# Patient Record
Sex: FEMALE | Race: WHITE | NOT HISPANIC OR LATINO | Employment: FULL TIME | URBAN - METROPOLITAN AREA
[De-identification: names, ages, dates, MRNs, and addresses within clinical notes are randomized per-mention and may not be internally consistent; named-entity substitution may affect disease eponyms.]

---

## 2021-04-16 DIAGNOSIS — Z23 ENCOUNTER FOR IMMUNIZATION: ICD-10-CM

## 2021-05-03 ENCOUNTER — IMMUNIZATIONS (OUTPATIENT)
Dept: FAMILY MEDICINE CLINIC | Facility: HOSPITAL | Age: 26
End: 2021-05-03

## 2021-05-03 DIAGNOSIS — Z23 ENCOUNTER FOR IMMUNIZATION: Primary | ICD-10-CM

## 2021-05-03 PROCEDURE — 0011A SARS-COV-2 / COVID-19 MRNA VACCINE (MODERNA) 100 MCG: CPT

## 2021-05-03 PROCEDURE — 91301 SARS-COV-2 / COVID-19 MRNA VACCINE (MODERNA) 100 MCG: CPT

## 2021-06-02 ENCOUNTER — IMMUNIZATIONS (OUTPATIENT)
Dept: FAMILY MEDICINE CLINIC | Facility: HOSPITAL | Age: 26
End: 2021-06-02

## 2021-06-02 DIAGNOSIS — Z23 ENCOUNTER FOR IMMUNIZATION: Primary | ICD-10-CM

## 2021-06-02 PROCEDURE — 0012A SARS-COV-2 / COVID-19 MRNA VACCINE (MODERNA) 100 MCG: CPT

## 2021-06-02 PROCEDURE — 91301 SARS-COV-2 / COVID-19 MRNA VACCINE (MODERNA) 100 MCG: CPT

## 2023-09-03 ENCOUNTER — APPOINTMENT (EMERGENCY)
Dept: RADIOLOGY | Facility: HOSPITAL | Age: 28
End: 2023-09-03

## 2023-09-03 ENCOUNTER — HOSPITAL ENCOUNTER (EMERGENCY)
Facility: HOSPITAL | Age: 28
Discharge: HOME | End: 2023-09-03
Attending: EMERGENCY MEDICINE

## 2023-09-03 VITALS
HEIGHT: 68 IN | SYSTOLIC BLOOD PRESSURE: 123 MMHG | OXYGEN SATURATION: 100 % | TEMPERATURE: 98.3 F | BODY MASS INDEX: 19.7 KG/M2 | HEART RATE: 72 BPM | RESPIRATION RATE: 18 BRPM | DIASTOLIC BLOOD PRESSURE: 85 MMHG | WEIGHT: 130 LBS

## 2023-09-03 DIAGNOSIS — J02.9 PHARYNGITIS, UNSPECIFIED ETIOLOGY: Primary | ICD-10-CM

## 2023-09-03 DIAGNOSIS — R07.81 PLEURITIC PAIN: ICD-10-CM

## 2023-09-03 LAB
ALBUMIN SERPL BCP-MCNC: 4.3 G/DL (ref 3.5–5)
ALP SERPL-CCNC: 53 U/L (ref 34–104)
ALT SERPL W P-5'-P-CCNC: 13 U/L (ref 7–52)
ANION GAP SERPL CALCULATED.3IONS-SCNC: 7 MMOL/L
AST SERPL W P-5'-P-CCNC: 15 U/L (ref 13–39)
BASOPHILS # BLD AUTO: 0.02 THOUSANDS/ÂΜL (ref 0–0.1)
BASOPHILS NFR BLD AUTO: 0 % (ref 0–1)
BILIRUB SERPL-MCNC: 0.31 MG/DL (ref 0.2–1)
BUN SERPL-MCNC: 14 MG/DL (ref 5–25)
CALCIUM SERPL-MCNC: 9.3 MG/DL (ref 8.4–10.2)
CHLORIDE SERPL-SCNC: 103 MMOL/L (ref 96–108)
CO2 SERPL-SCNC: 27 MMOL/L (ref 21–32)
CREAT SERPL-MCNC: 0.66 MG/DL (ref 0.6–1.3)
EOSINOPHIL # BLD AUTO: 0.05 THOUSAND/ÂΜL (ref 0–0.61)
EOSINOPHIL NFR BLD AUTO: 1 % (ref 0–6)
ERYTHROCYTE [DISTWIDTH] IN BLOOD BY AUTOMATED COUNT: 11.9 % (ref 11.6–15.1)
GFR SERPL CREATININE-BSD FRML MDRD: 122 ML/MIN/1.73SQ M
GLUCOSE SERPL-MCNC: 80 MG/DL (ref 65–140)
HCT VFR BLD AUTO: 38.4 % (ref 34.8–46.1)
HGB BLD-MCNC: 12.5 G/DL (ref 11.5–15.4)
IMM GRANULOCYTES # BLD AUTO: 0 THOUSAND/UL (ref 0–0.2)
IMM GRANULOCYTES NFR BLD AUTO: 0 % (ref 0–2)
LYMPHOCYTES # BLD AUTO: 1.88 THOUSANDS/ÂΜL (ref 0.6–4.47)
LYMPHOCYTES NFR BLD AUTO: 40 % (ref 14–44)
MCH RBC QN AUTO: 31.3 PG (ref 26.8–34.3)
MCHC RBC AUTO-ENTMCNC: 32.6 G/DL (ref 31.4–37.4)
MCV RBC AUTO: 96 FL (ref 82–98)
MONOCYTES # BLD AUTO: 0.51 THOUSAND/ÂΜL (ref 0.17–1.22)
MONOCYTES NFR BLD AUTO: 11 % (ref 4–12)
NEUTROPHILS # BLD AUTO: 2.2 THOUSANDS/ÂΜL (ref 1.85–7.62)
NEUTS SEG NFR BLD AUTO: 48 % (ref 43–75)
NRBC BLD AUTO-RTO: 0 /100 WBCS
PLATELET # BLD AUTO: 253 THOUSANDS/UL (ref 149–390)
PMV BLD AUTO: 11.6 FL (ref 8.9–12.7)
POTASSIUM SERPL-SCNC: 3.6 MMOL/L (ref 3.5–5.3)
PROT SERPL-MCNC: 7.4 G/DL (ref 6.4–8.4)
RBC # BLD AUTO: 3.99 MILLION/UL (ref 3.81–5.12)
S PYO DNA THROAT QL NAA+PROBE: NOT DETECTED
SODIUM SERPL-SCNC: 137 MMOL/L (ref 135–147)
TSH SERPL DL<=0.05 MIU/L-ACNC: 1.19 UIU/ML (ref 0.45–4.5)
WBC # BLD AUTO: 4.66 THOUSAND/UL (ref 4.31–10.16)

## 2023-09-03 PROCEDURE — 86308 HETEROPHILE ANTIBODY SCREEN: CPT

## 2023-09-03 PROCEDURE — 87651 STREP A DNA AMP PROBE: CPT | Performed by: EMERGENCY MEDICINE

## 2023-09-03 PROCEDURE — 71045 X-RAY EXAM CHEST 1 VIEW: CPT

## 2023-09-03 PROCEDURE — 93005 ELECTROCARDIOGRAM TRACING: CPT

## 2023-09-03 PROCEDURE — 36415 COLL VENOUS BLD VENIPUNCTURE: CPT

## 2023-09-03 PROCEDURE — 99283 EMERGENCY DEPT VISIT LOW MDM: CPT

## 2023-09-03 PROCEDURE — 84443 ASSAY THYROID STIM HORMONE: CPT

## 2023-09-03 PROCEDURE — 85025 COMPLETE CBC W/AUTO DIFF WBC: CPT

## 2023-09-03 PROCEDURE — 80053 COMPREHEN METABOLIC PANEL: CPT

## 2023-09-03 NOTE — ED ATTENDING ATTESTATION
9/3/2023  IJorge MD, saw and evaluated the patient. I have discussed the patient with the resident/non-physician practitioner and agree with the resident's/non-physician practitioner's findings, Plan of Care, and MDM as documented in the resident's/non-physician practitioner's note, except where noted. All available labs and Radiology studies were reviewed. I was present for key portions of any procedure(s) performed by the resident/non-physician practitioner and I was immediately available to provide assistance. At this point I agree with the current assessment done in the Emergency Department. I have conducted an independent evaluation of this patient a history and physical is as follows:    27-year-old otherwise healthy female presented for evaluation of about a week of right-sided pleuritic chest pain as well as a sore throat. Denies any difficulty breathing. No cough. No fever, chills. No sick contacts. Reports throat is been sore but no difficulty swallowing or breathing. No abdominal pain, nausea, vomiting. On exam here she is well-appearing overall. Vitals are normal.  Oropharynx moist with bilateral tonsillar edema with exudates. Midline uvula. No change in voice. Heart sounds normal.  Breath sounds are clear. Abdomen soft and nontender. No rib tenderness. PERC negative. Plan EKG, chest x-ray, labs. Differential diagnosis includes viral illness including mono, strep pharyngitis, pneumonia, pneumothorax. ED Course  ED Course as of 09/03/23 Porsha Pacheco Sep 03, 2023   1634 STREP A PCR: Not Detected   0968 CXR reviewed: Normal.   1821 Lab work unremarkable. Monospot pending. Stable for discharge. Recommended NSAIDs for discomfort. Avoid strenuous activity.          Critical Care Time  ECG 12 Lead Documentation Only    Date/Time: 9/3/2023 5:43 PM    Performed by: Jorge Rendon MD  Authorized by: Jorge Rendon MD    Indications / Diagnosis:  Chest pain  ECG reviewed by me, the ED Provider: yes    Patient location:  ED  Previous ECG:     Previous ECG:  Unavailable  Rate:     ECG rate:  61  Rhythm:     Rhythm: sinus rhythm    Ectopy:     Ectopy: none    QRS:     QRS axis:  Normal  Conduction:     Conduction: normal    ST segments:     ST segments:  Normal  T waves:     T waves: normal

## 2023-09-03 NOTE — ED PROVIDER NOTES
History  Chief Complaint   Patient presents with   • Rib Pain     Pt presenting with right sided rib pain, pain with inspiration and sore throat for a few days. She was cleaning out her grandmothers house and isn't sure if she inhaled something. Pt denies fevers, CP. Pt has been taking otc meds without relief. • Sore Throat     32 yo female with no pertinent PMH presents to the ED for evaluation of a sharp right-sided pain below her breast both anteriorly and posteriorly that began approximately 1 week ago. She notes the pain especially when she takes deep breaths. Additionally, patient reports a sore throat which with white/red patches to the posterior throat, fatigue, and generalized body aches. Her appetite has not changed, and she does not have any dysphagia. She states that she was cleaning out old rooms in her grandmother's house prior to the onset of these symptoms. Patient also states that she has been experiencing some stress recently, and has been experiencing palpitations, difficulty sleeping, and night sweats. Patient denies any fever, chills, shortness of breath, nausea, vomiting, abdominal pain, dysuria, or flank pain. None       History reviewed. No pertinent past medical history. History reviewed. No pertinent surgical history. History reviewed. No pertinent family history. I have reviewed and agree with the history as documented. E-Cigarette/Vaping     E-Cigarette/Vaping Substances     Social History     Tobacco Use   • Smoking status: Never   Substance Use Topics   • Alcohol use: No   • Drug use: No        Review of Systems   Constitutional: Positive for fatigue. Negative for chills and fever. Generalized body aches   HENT: Positive for sore throat. Negative for ear pain. Eyes: Negative for pain and visual disturbance. Respiratory: Negative for cough and shortness of breath.          Sharp chest wall pain below right breast anteriorly and posteriorly Cardiovascular: Negative for chest pain and palpitations. Gastrointestinal: Negative for abdominal pain and vomiting. Genitourinary: Negative for dysuria and hematuria. Musculoskeletal: Negative for arthralgias and back pain. Skin: Negative for color change and rash. Neurological: Negative for seizures and syncope. All other systems reviewed and are negative. Physical Exam  ED Triage Vitals [09/03/23 1532]   Temperature Pulse Respirations Blood Pressure SpO2   98.3 °F (36.8 °C) 73 17 147/96 100 %      Temp Source Heart Rate Source Patient Position - Orthostatic VS BP Location FiO2 (%)   Oral Monitor Sitting Right arm --      Pain Score       --             Orthostatic Vital Signs  Vitals:    09/03/23 1532 09/03/23 1733   BP: 147/96 123/85   Pulse: 73 72   Patient Position - Orthostatic VS: Sitting Lying       Physical Exam  Vitals and nursing note reviewed. Constitutional:       General: She is not in acute distress. Appearance: She is well-developed. She is not ill-appearing. HENT:      Head: Normocephalic and atraumatic. Nose: No congestion. Mouth/Throat:      Mouth: Mucous membranes are dry. Tonsils: Tonsillar exudate (bilaterally) present. Eyes:      Conjunctiva/sclera: Conjunctivae normal.   Cardiovascular:      Rate and Rhythm: Normal rate and regular rhythm. Heart sounds: No murmur heard. Pulmonary:      Effort: Pulmonary effort is normal. No respiratory distress. Breath sounds: Normal breath sounds. Abdominal:      Palpations: Abdomen is soft. Tenderness: There is no abdominal tenderness. Musculoskeletal:         General: No swelling. Cervical back: Neck supple. Lymphadenopathy:      Cervical: No cervical adenopathy. Skin:     General: Skin is warm and dry. Capillary Refill: Capillary refill takes less than 2 seconds. Neurological:      Mental Status: She is alert.    Psychiatric:         Mood and Affect: Mood normal.         ED Medications  Medications - No data to display    Diagnostic Studies  Results Reviewed     Procedure Component Value Units Date/Time    TSH, 3rd generation with Free T4 reflex [[id number]]  (Normal) Collected: 09/03/23 1726    Lab Status: Final result Specimen: Blood from Arm, Right Updated: 09/03/23 1820     TSH 3RD GENERATON 1.193 uIU/mL     Comprehensive metabolic panel [[id number]] Collected: 09/03/23 1726    Lab Status: Final result Specimen: Blood from Arm, Right Updated: 09/03/23 1807     Sodium 137 mmol/L      Potassium 3.6 mmol/L      Chloride 103 mmol/L      CO2 27 mmol/L      ANION GAP 7 mmol/L      BUN 14 mg/dL      Creatinine 0.66 mg/dL      Glucose 80 mg/dL      Calcium 9.3 mg/dL      AST 15 U/L      ALT 13 U/L      Alkaline Phosphatase 53 U/L      Total Protein 7.4 g/dL      Albumin 4.3 g/dL      Total Bilirubin 0.31 mg/dL      eGFR 122 ml/min/1.73sq m     Narrative:      [facility] guidelines for Chronic Kidney Disease (CKD):   •  Stage 1 with normal or high GFR (GFR > 90 mL/min/1.73 square meters)  •  Stage 2 Mild CKD (GFR = 60-89 mL/min/1.73 square meters)  •  Stage 3A Moderate CKD (GFR = 45-59 mL/min/1.73 square meters)  •  Stage 3B Moderate CKD (GFR = 30-44 mL/min/1.73 square meters)  •  Stage 4 Severe CKD (GFR = 15-29 mL/min/1.73 square meters)  •  Stage 5 End Stage CKD (GFR <15 mL/min/1.73 square meters)  Note: GFR calculation is accurate only with a steady state creatinine    CBC and differential [[id number]] Collected: 09/03/23 1726    Lab Status: Final result Specimen: Blood from Arm, Right Updated: 09/03/23 1747     WBC 4.66 Thousand/uL      RBC 3.99 Million/uL      Hemoglobin 12.5 g/dL      Hematocrit 38.4 %      MCV 96 fL      MCH 31.3 pg      MCHC 32.6 g/dL      RDW 11.9 %      MPV 11.6 fL      Platelets 445 Thousands/uL      nRBC 0 /100 WBCs      Neutrophils Relative 48 %      Immat GRANS % 0 %      Lymphocytes Relative 40 %      Monocytes Relative 11 %      Eosinophils Relative 1 %      Basophils Relative 0 %      Neutrophils Absolute 2.20 Thousands/µL      Immature Grans Absolute 0.00 Thousand/uL      Lymphocytes Absolute 1.88 Thousands/µL      Monocytes Absolute 0.51 Thousand/µL      Eosinophils Absolute 0.05 Thousand/µL      Basophils Absolute 0.02 Thousands/µL     Mononucleosis screen [[id number]] Collected: 09/03/23 1726    Lab Status: In process Specimen: Blood from Arm, Right Updated: 09/03/23 1743    Strep A PCR [[id number]]  (Normal) Collected: 09/03/23 1539    Lab Status: Final result Specimen: Throat Updated: 09/03/23 1620     STREP A PCR Not Detected                 XR chest 1 view portable    (Results Pending)         Procedures  ECG 12 Lead Documentation Only    Date/Time: 9/3/2023 5:57 PM    Performed by: Esha Godwin MD  Authorized by: Esha Godwin MD    ECG reviewed by me, the ED Provider: yes    Previous ECG:     Previous ECG:  Unavailable  Interpretation:     Interpretation: normal    Rate:     ECG rate:  61    ECG rate assessment: normal    Rhythm:     Rhythm: sinus rhythm    Ectopy:     Ectopy: none    QRS:     QRS axis:  Normal  Conduction:     Conduction: normal    ST segments:     ST segments:  Normal  T waves:     T waves: normal            ED Course  ED Course as of 09/03/23 1905   Sun Sep 03, 2023   1706 STREP A PCR: Not Detected   1711 XR chest 1 view portable  No acute pathology noted. No effusion or consolidation.    1745 ECG - NSR, unremarkable                     PERC Rule for PE    Flowsheet Row Most Recent Value   PERC Rule for PE    Age >=50 0 Filed at: 09/03/2023 1757   HR >=100 0 Filed at: 09/03/2023 1757   O2 Sat on room air < 95% 0 Filed at: 09/03/2023 1757   History of PE or DVT 0 Filed at: 09/03/2023 1757   Recent trauma or surgery 0 Filed at: 09/03/2023 1757   Hemoptysis 0 Filed at: 09/03/2023 1757   Exogenous estrogen 0 Filed at: 09/03/2023 1757   Unilateral leg swelling 0 Filed at: 09/03/2023 1757   PERC Rule for PE Results 0 Filed at: 09/03/2023 1757                        Medical Decision Making  32 yo female with no pertinent PMH presents to the ED for evaluation of a sharp right-sided pain below her breast both anteriorly and posteriorly that began approximately 1 week ago. She notes the pain especially when she takes deep breaths. Additionally, patient reports a sore throat which with white/red patches to the posterior throat, fatigue, and generalized body aches. Patient also states that she has been experiencing some stress recently, and has been experiencing palpitations, difficulty sleeping, and night sweats. On exam, tonsillar exudates were noted bilaterally. Differential diagnoses include strep throat, viral illness, pleuritic chest pain. Labs ordered to rule out electrolyte abnormalities, anemia, thyroid dysfunction, strep pharyngitis, infectious mononucleosis. As labs and EKG unremarkable, patient is cleared for discharge to home. Mononucleosis screen pending, and patient made aware. Recommend rest, increased fluid intake, and avoidance of strenuous activities. Amount and/or Complexity of Data Reviewed  Labs: ordered. Decision-making details documented in ED Course. ECG/medicine tests: ordered. Decision-making details documented in ED Course. Disposition  Final diagnoses:   Pharyngitis, unspecified etiology   Pleuritic pain     Time reflects when diagnosis was documented in both MDM as applicable and the Disposition within this note     Time User Action Codes Description Comment    9/3/2023  6:52 PM Sharlene Pereira Add [J02.9] Pharyngitis, unspecified etiology     9/3/2023  6:52 PM Sharlene Pereira Add [R07.81] Pleuritic pain       ED Disposition     ED Disposition   Discharge    Condition   Stable    Date/Time   Sun Sep 3, 2023  6:52 PM    Comment   Timmy Gave discharge to home/self care.                Follow-up Information    None         Patient's Medications    No medications on file     No discharge procedures on file.    PDMP Review     None           ED Provider  Attending physically available and evaluated [address]. I managed the patient along with the ED Attending.     Electronically Signed by         Aleksey Kiran MD  09/03/23 0619

## 2023-09-03 NOTE — DISCHARGE INSTRUCTIONS
In the event of any new or worsening symptoms, contact your PCP and/or return to the ED immediately.

## 2023-09-04 LAB — HETEROPH AB SER QL: NEGATIVE

## 2023-09-06 LAB
ATRIAL RATE: 61 BPM
P AXIS: 75 DEGREES
PR INTERVAL: 146 MS
QRS AXIS: 72 DEGREES
QRSD INTERVAL: 76 MS
QT INTERVAL: 420 MS
QTC INTERVAL: 422 MS
T WAVE AXIS: 75 DEGREES
VENTRICULAR RATE: 61 BPM

## 2023-09-06 PROCEDURE — 93010 ELECTROCARDIOGRAM REPORT: CPT | Performed by: INTERNAL MEDICINE

## 2024-02-27 ENCOUNTER — HOSPITAL ENCOUNTER (EMERGENCY)
Facility: HOSPITAL | Age: 29
Discharge: HOME | End: 2024-02-27
Attending: EMERGENCY MEDICINE | Admitting: EMERGENCY MEDICINE

## 2024-02-27 VITALS
DIASTOLIC BLOOD PRESSURE: 87 MMHG | TEMPERATURE: 98.4 F | HEART RATE: 67 BPM | SYSTOLIC BLOOD PRESSURE: 139 MMHG | RESPIRATION RATE: 18 BRPM | OXYGEN SATURATION: 100 %

## 2024-02-27 DIAGNOSIS — R21 RASH: Primary | ICD-10-CM

## 2024-02-27 PROCEDURE — 99282 EMERGENCY DEPT VISIT SF MDM: CPT

## 2024-02-27 PROCEDURE — 99284 EMERGENCY DEPT VISIT MOD MDM: CPT | Performed by: EMERGENCY MEDICINE

## 2024-02-27 RX ORDER — LORATADINE 10 MG/1
10 TABLET ORAL DAILY
Status: DISCONTINUED | OUTPATIENT
Start: 2024-02-27 | End: 2024-02-27 | Stop reason: HOSPADM

## 2024-02-27 RX ORDER — CETIRIZINE HYDROCHLORIDE 10 MG/1
10 TABLET ORAL DAILY
Qty: 10 TABLET | Refills: 0 | Status: SHIPPED | OUTPATIENT
Start: 2024-02-27 | End: 2024-03-08

## 2024-02-27 RX ORDER — PREDNISONE 20 MG/1
40 TABLET ORAL DAILY
Qty: 8 TABLET | Refills: 0 | Status: SHIPPED | OUTPATIENT
Start: 2024-02-27 | End: 2024-03-02

## 2024-02-27 RX ORDER — PREDNISONE 20 MG/1
60 TABLET ORAL ONCE
Status: COMPLETED | OUTPATIENT
Start: 2024-02-27 | End: 2024-02-27

## 2024-02-27 RX ADMIN — PREDNISONE 60 MG: 20 TABLET ORAL at 12:02

## 2024-02-27 RX ADMIN — LORATADINE 10 MG: 10 TABLET ORAL at 12:02

## 2024-02-27 NOTE — Clinical Note
Isabel Mehta was seen and treated in our emergency department on 2/27/2024.                Diagnosis:     Isabel  .    She may return on this date:          If you have any questions or concerns, please don't hesitate to call.      Dennis Clarke MD    ______________________________           _______________          _______________  Hospital Representative                              Date                                Time

## 2024-02-27 NOTE — ED ATTENDING ATTESTATION
2/27/2024  IEliana DO, saw and evaluated the patient. I have discussed the patient with the resident/non-physician practitioner and agree with the resident's/non-physician practitioner's findings, Plan of Care, and MDM as documented in the resident's/non-physician practitioner's note, except where noted. All available labs and Radiology studies were reviewed.  I was present for key portions of any procedure(s) performed by the resident/non-physician practitioner and I was immediately available to provide assistance.       At this point I agree with the current assessment done in the Emergency Department.  I have conducted an independent evaluation of this patient a history and physical is as follows:    ED Course  8-year-old female presents to the emergency department for evaluation of skin rash.  Patient states that she first noticed an itchy rash on the dorsum of both hands and wrists.  She next developed the same rash on her neck and lower portion of her face and earlobes.  The rash is dry and extremely itchy.  She has had minimal relief with Benadryl.  Patient works as a hairdresser.  She denies contact with new chemicals.  She has no known allergies.  No new foods.  No new cosmetics.  No fevers or chills.  She denies systemic symptoms such as wheezing, throat swelling or cough.  No wheezing, SOB, n/v.      PMHX: Negative    PE: Patient is awake and alert, nontoxic in appearance.  Pupils are equal and reactive.  Pharynx without exudate.  There is an eczematous maculopapular dry rash involving the lower portion of the face, neck, dorsum of both hands and wrists.  Rash is nontender.  No associated swelling.  No drainage.  Heart is regular rate and rhythm without ectopy.  Lungs are clear to auscultation without wheezes.  Patient moves all 4 extremities.    Assessment: 28-year-old female presents with itchy rash.  Differential diagnosis includes but is not limited to acute eczematous dermatitis, contact  dermatitis, allergic reaction to cosmetic or other chemical.    Plan: Will treat rash with oral antihistamine, corticosteroid, reassess.      Critical Care Time  Procedures

## 2024-02-27 NOTE — DISCHARGE INSTRUCTIONS
Please take steroids and Zyrtec as prescribed.  Monitor for resolution of rash and follow-up with family doctor as needed for reassessment and management of symptoms.    Thank you for allowing us to take part in your care.

## 2024-02-27 NOTE — ED PROVIDER NOTES
"History  Chief Complaint   Patient presents with    Rash     PT presents to ED with an \"itchy rash that started on hands and wrist and yesterday it started on neck and going on to face\" PT denies changes in soaps, lotions, or laundry detergents     38-year-old female presenting due to rash of hands, anterior neck and face.  Patient states 4 days ago she had nausea and a headache that resolved after a couple days and 2 days ago patient developed a rash on the back of her hands which spread to her breasts and yesterday rash spread to anterior neck, chin area and below the eyes.  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  Patient has not had a rash like this in the past and denies any shortness of breath, chest pain, abdominal pain.        None       History reviewed. No pertinent past medical history.    History reviewed. No pertinent surgical history.    History reviewed. No pertinent family history.  I have reviewed and agree with the history as documented.    E-Cigarette/Vaping    E-Cigarette Use Current Every Day User      E-Cigarette/Vaping Substances    Nicotine Yes     THC No     CBD No     Flavoring No     Other No     Unknown No      Social History     Tobacco Use    Smoking status: Never    Smokeless tobacco: Never   Vaping Use    Vaping status: Every Day    Substances: Nicotine   Substance Use Topics    Alcohol use: Yes     Comment: socially    Drug use: No        Review of Systems   Constitutional:  Negative for chills and fever.   HENT:  Negative for ear pain and sore throat.    Eyes:  Negative for pain and visual disturbance.   Respiratory:  Negative for cough and shortness of breath.    Cardiovascular:  Negative for chest pain and palpitations.   Gastrointestinal:  Negative for abdominal pain, constipation, diarrhea, nausea and vomiting.   Genitourinary:  Negative for dysuria and hematuria.   Musculoskeletal:  Negative for " arthralgias, back pain, neck pain and neck stiffness.   Skin:  Positive for rash. Negative for color change.   Neurological:  Negative for dizziness, seizures, syncope, weakness, light-headedness and headaches.   All other systems reviewed and are negative.      Physical Exam  ED Triage Vitals [02/27/24 1056]   Temperature Pulse Respirations Blood Pressure SpO2   98.4 °F (36.9 °C) 67 18 139/87 100 %      Temp Source Heart Rate Source Patient Position - Orthostatic VS BP Location FiO2 (%)   Oral Monitor Lying Right arm --      Pain Score       --             Orthostatic Vital Signs  Vitals:    02/27/24 1056   BP: 139/87   Pulse: 67   Patient Position - Orthostatic VS: Lying       Physical Exam  Vitals and nursing note reviewed.   Constitutional:       General: She is not in acute distress.     Appearance: She is well-developed.   HENT:      Head: Normocephalic and atraumatic.      Nose: Nose normal. No congestion.   Eyes:      Extraocular Movements: Extraocular movements intact.      Conjunctiva/sclera: Conjunctivae normal.   Cardiovascular:      Rate and Rhythm: Normal rate and regular rhythm.      Pulses: Normal pulses.      Heart sounds: Normal heart sounds. No murmur heard.  Pulmonary:      Effort: Pulmonary effort is normal. No respiratory distress.      Breath sounds: Normal breath sounds.   Chest:      Chest wall: No tenderness.   Abdominal:      General: Abdomen is flat. Bowel sounds are normal.      Palpations: Abdomen is soft.      Tenderness: There is no abdominal tenderness.   Musculoskeletal:         General: No deformity or signs of injury. Normal range of motion.      Cervical back: Normal range of motion and neck supple. No rigidity or tenderness.   Skin:     General: Skin is warm and dry.      Findings: Rash present. No bruising or lesion.      Comments: Urticarial rash on the posterior hand and wrist sparing the soles, also present on the anterior neck, lower cheeks and on the earlobes and under the  eyes bilaterally.  Rash does not include the mucous membranes.  Clear lungs bilaterally.  Rashes not on trunk or lower extremities.    Neurological:      General: No focal deficit present.      Mental Status: She is alert.      Cranial Nerves: No cranial nerve deficit.      Sensory: No sensory deficit.         ED Medications  Medications   predniSONE tablet 60 mg (60 mg Oral Given 2/27/24 1202)       Diagnostic Studies  Results Reviewed       None                   No orders to display         Procedures  Procedures      ED Course  ED Course as of 02/28/24 0847   Tue Feb 27, 2024   1200 28 yoF presenting due to urticarial rash that started on back of hands and spread to neck and face.  No signs of anaphylaxis, does not include palms, soles or mucous membranes. Pt denies any difficulty breathing, abd pain, SOB or airway compromise.  Pt does not recall any new exposures.  Vitals are WNL.  At this time with prescribe zyrtec and short course of prednisone.  Recommend f/u with PCP if symptoms do not improve after treatment.                             SBIRT 22yo+      Flowsheet Row Most Recent Value   Initial Alcohol Screen: US AUDIT-C     1. How often do you have a drink containing alcohol? 0 Filed at: 02/27/2024 1154   2. How many drinks containing alcohol do you have on a typical day you are drinking?  0 Filed at: 02/27/2024 1154   3a. Male UNDER 65: How often do you have five or more drinks on one occasion? 0 Filed at: 02/27/2024 1154   3b. FEMALE Any Age, or MALE 65+: How often do you have 4 or more drinks on one occassion? 0 Filed at: 02/27/2024 1154   Audit-C Score 0 Filed at: 02/27/2024 1154   ADRIAN: How many times in the past year have you...    Used an illegal drug or used a prescription medication for non-medical reasons? Never Filed at: 02/27/2024 1154                  Medical Decision Making  Risk  OTC drugs.  Prescription drug management.          Disposition  Final diagnoses:   Rash     Time reflects when  diagnosis was documented in both MDM as applicable and the Disposition within this note       Time User Action Codes Description Comment    2/27/2024 11:57 AM Dennis Clarke Add [R21] Rash           ED Disposition       ED Disposition   Discharge    Condition   Stable    Date/Time   Tue Feb 27, 2024 1209    Comment   Isabel Mehta discharge to home/self care.                   Follow-up Information    None         Discharge Medication List as of 2/27/2024 12:10 PM        START taking these medications    Details   cetirizine (ZyrTEC) 10 mg tablet Take 1 tablet (10 mg total) by mouth daily for 10 days, Starting Tue 2/27/2024, Until Fri 3/8/2024, Normal      predniSONE 20 mg tablet Take 2 tablets (40 mg total) by mouth daily for 4 days, Starting Tue 2/27/2024, Until Sat 3/2/2024, Normal           No discharge procedures on file.    PDMP Review       None             ED Provider  Attending physically available and evaluated Isabel Mehta. I managed the patient along with the ED Attending.    Electronically Signed by           Dennis Clarke MD  02/28/24 9017